# Patient Record
(demographics unavailable — no encounter records)

---

## 2024-10-07 NOTE — REASON FOR VISIT
[FreeTextEntry1] : [email or web address] [phone]  I had the pleasure of meeting with Candelario Casas (Benny), for a follow up appointment. Accompanied today by his. Parent - Rissa  Introduction:  Initial Consultation Assessment (6/2020):  Candelario Casas (Benny), age ~6.1 years, presented for consultation for concerns regarding possible autism. This has been a parental concern and a concern of some of his school teachers/psychologist. Reportedly there are some family members with some autistic features. The following is reported/observed: -atypical prosody -hand flapping -misses some social cues -somewhat has some focused interests -has a social interest -perhaps some weaknesses in eye contact -demonstrates shared enjoyment -some difficulty with transitions  Additionally Earl is noted to become distracted easily and requires redirection often.  Overall Earl's presentation is consistent with the following:  *ADHD- predominantly inattentive type  *Autism Spectrum Disorder (level 1 for social and level 1 for behavior)  *Additionally there are some fine motor concerns, speech concerns, and behavioral concerns.   INTERIM HISTORY:  School: __1/2023: -some behavioral issues -has 1:1 -was upset in school and rocking himself -varied beh in school - some great days and other times more scattered days -writing assignments - got upset b/c teacher said something about sharing it with the class -eloped down the de paz once - Principal became involved - relate to the writing assignment -reportedly has BIP in place __12/2023: -may refuse to do writing work - adding in some supports -wasn't participating in specials - parent made some consequences and now he is doing much better -Recess: doesn't participate, sits by himself -if paper wasn't neat might rip it out __10/2024: -still at times some refusals - responds more to natural consequences than in the past, maturing, so more able to convince him if needed than in the past  Diet issues: has in his mind that it is weird to eat in school. Also dislikes the smell in the cafeteria. Earl reports feels weird eating in school - doesn't make a diff where. Also notes doesn't like the smell of the cafeteria food. Reports wouldn't make a difference if could eat elsewhere in school __1/2023: continues to refuse to eat/drink in school __3/2023: some improvement in Gen Ed class instead of Dual Lang Gen Ed class. Good week last week. Not using beh chart anymore as was making him to stressed. Overall improvement in behavior although still some issues. Dislikes writing. __12/2023: -often refusing to eat in school - uncomfortable - sometimes drinks in school - when returns home he eats __10/2024: -now willing to eat in school  Summer: -mostly didn't use the AXR, and if needed used the short acting -Summer 2023: gained some wt over the summer but losing it over school yr. No medicine over the summer -Summer 2024: rarely used the medication - gained ~10 lbs. Did some swimming  ~10/2022 - trialed decrease from 15mg to 10mg - clear difference, much more benefit on the 15mg and so returned to 15mg  *Medication: -Name and Dose: AXR 15mg -Administration Time: 8am -Administration Method: swallowing -Wears off/Waning effect: Around 3:15pm -Duration: likely suff for school and completes his HW still -Effects/Benefit: __Home: benefit noted __School: clear benefit noted -more attentive, completing his work, doing much better with the medication, more cooperative. 3/2023 - clear benefit, teacher could tell a difference if missed a day 10/2024 - tried 2 days w/o it - couldn't concentrate, less emotionally regulated (got irate at a moment) ate more, more social at recess -benefit in terms of focus seem fair -Diet: eating less than usual - but not clearly related to the medication, perhaps not as hungry when in school -. Eats dinner and breakfast - not a lot - but not clearly related to the medication. On 15mg clear relation to medication. During pandemic had refused eating in school -rigid regarding it. Wont allow parents to pack a snack - just water. Eats when he comes home. Sometimes very hungry when returns home. Small breakfast. Dinner - feast or famine. Ate a lot more over the summer w/o the medication. Eats more on Wknds w/o the medication. Perhaps ate a little more when taking AXR 10mg. Mother - Kristy notes (1/2023) clear relationship with medication and diet. Mother - Rissa in the past hasn't reported clear relationship. 3/2023: a couple of days where ate ice cream in school - seems like making some progress 12/2023 - continues rarely eating in school partially related to med but also unrelated in large part -Sleep: no change -Other Side Effects: none -Weekends: just on school days  Adderall 5mg: -as needed - rarely use -lasts 3-4 hours. -sufficient benefit  Social: -best friend moved -some playdates with peer in school, some weaknesses socially. Plays w/ cousins __12/2023: -has 1-2 friends -wont sit with peers at lunch - sometimes w/ teacher or aide -has cousin in his class which is helpful __10/2024: -very few friends, has a cousin in his class. Recess mostly walks around and doesn't engage much with peers -now expresses social interest -seems nervous sharing information as feel may be judged negatively  *Therapy (Sandra Lopez, ~1/2021): -working on anger with transitions -10/2022: no longer seeing therapist, was hard to get him to go *Therapy: -Kimi Donnelly - [facility] - he enjoys going (started summer 2023-) - working on emotions and expressing himself (seen in [facility] as was having stressful day in school and began banging his head)  ***Prior to Medication (1/2021): Difficulty concentrating and doing class work. Doesn't want to do his work in school - negatively impacting his academic progress- doesn't demonstrate the skills he knows. Earl reports that 'cant work himself up to doing things' - the academics - and reports that he has diff focusing. . Wants to skip some of the assignments in class. Seems like a combination of focusing challenges and motivation issues.   SCHOOL HISTORY/DEVELOPMENTAL SERVICES: Academics: not demonstrating his skills - improving with medication, still not consistently. Reading chapter books at home __10/2022: above GL in many areas. OT issues with writing and so becomes rigid regarding __1/2023: doing ~so so __3/2023: very strong in some areas, but some areas of weakness, wont summarize things when asked/depending on how asked __12/2023: academically strong, doesn't like writing though and may refuse to do writing __10/2024: doing well   School Name: [employer] Grade: 5th Services: -regular classroom -shifted to [employer] in 4th grade, in past had IEP with OT, 1:1 for beh/attn, classroom accommodations, counseling -will be discontinuing individual counseling - wasn't participating - shifting more towards group counseling as does well with it - has Group counseling (participates well)  FBA (2021-): -added to his IEP  PREVIOUS ASSESSMENTS/SERVICES:  No EI or CPSE evaluation reports.   CURRENT FUNCTIONING/HISTORY OF PRESENTING CONCERNS:  ********As noted during his initial consultation ********  Concerns noted on our intake paperwork include: -K teacher and school psychologist suggested he be evaluated for autism -difficulties with social interactions, not wanting to join group activities -some flapping, covering his ears, trouble getting words out -has high intelligence -began having significant difficulties interaction with peers in [employer] - age 4 -some acting out behaviors and pushing peers in [employer] -special interest in Legos - can complete sets meant for much older children   Today's concerns: -difficulties with changes in routine -concern for Autism -at times needs assistance to help complete routine -disliking changing activities in school - like going to carpet or going to the bus  Began  at 3 yo: -often didn't participate -didn't like going to school - once got there, was ok -sometimes handsy with peers -at times repeated same jokes to peers, repetitively -personal space issues - tap people on shoulder, bump into peers - somewhat in a joking manner -participated only on his terms -made social bids but not always received well, however in general he is liked by peers  : -does ok with peers in K -still needs encouragement to participate/transition -some issues staying on task, or not participating  *Teacher intake form (5/2020): -General Ed, dual language - Eng/Mosotho -bright child, excels in reading -weaknesses in writing and attention span -little to no interest in being in school, lacks motivation, has trouble completing work -worse behavior in structured academic setting -shows little interest in peers, seems to parallel play -Strategies: pref seating, separate space for him, buddy helper at his table -Reading: on grade level -Math: on grade level -Writing: inconsistent, incorrect pencil , needs extended time -Rarely completes seatwork and lacks quality of work -Do you feel child is in need of SPED services? Feel he is in need of accommodations and additional classroom support  Language/Communication: -Eye contact: fair, although somewhat fleeting at times, at times hard to get his attention if involved in something else -Response to name: variable, especially if involved in another task - in his own world - seems to hear what is being said but ignoring -When younger - pointed, perhaps without associated eye contact -likely followed a point with associated eye contact -demonstrates shared enjoyment - fairly robust, looks for acknowledgement / praise -sometimes some weaknesses in picking up on social cues -speaks in sentences, at times takes him time to get the words out -may continue speaking about something of interest to him and not realize - even when verbalized-that listener has lost interest - peers or adults -no echolalia -tries telling jokes, tends to be very funny - and tries to be funny -able to have a reciprocal conversation, at times can report events -prosody: atypical prosody  Behavioral: -sometimes growls at adults if doesn't want to do something -not more active than peers -easily distracted, focusing challenges -takes longer than expected to complete school work -attentional challenges even when motivated to do so -gets distracted when trying to complete Lego project -some motivational issues with school work, but attentional challenges beyond motivation issues  Emotional: -generally happy - although mood can change quickly - can get upset if asked to do something he doesn't want to do - stephanie. around transitions - but can calm fairly quickly, and responds to threat of consequences -may seem down /upset if doesn't want to do something being asked of him/transitions  Social/Play Skills: -has some friends, liked by many peers, has had some playdates that generally go well -some issues with boundaries, personal space issues -may want to lead the play, but somewhat flexible -has a social interest -enjoys Legos, [employer] -generally plays with peers in their intended manner   Atypical Behaviors/Sensory: -transition challenges - more from preferred to non-preferred activities, overall fairly flexible - timers sometimes are helpful -sometimes lines up blocks - 'makes a path' -hand flapping: when watching something exciting on TV, or bouncing on the floor/couch - more so as he has gotten older - but varies - when excited or stressed -in past: played with his ear, folding over the auricle -Loud noises: dislikes hand dryer, etc. -dislikes 'chaotic' social gatherings- will run around 'like crazy' with one peer, but doesn't like when many peers running around -Texture: likes fleece, but not obsessively -no visual inspection -no focus on wheels -likes spinning around but doesn't seem atypical -no finger flicking, no visual stim behaviors -intermittent very focused interest  Early Developmental Milestones: -late walking (clubfoot) and some speech delay (minor)   ADAPTIVE FUNCTIONING:  Self-Care: Fair skills  Toileting: No current issues  Feeding: "5 yo diet", at times only wants the same food all the time. Has some variety. Eats different textures. Often doesn't want to try new foods. Somewhat picky with brand of chicken nuggets  Sleep: no issues. Some slight snoring.  *********  *MEDICAL HISTORY:  Current Medications: -AXR 15mg -Adderall 5mg  Medication History:  *MERLY 10mg (~1/2021-STOPPED, tried briefly): -mouth twitching, severe, no benefit  *Adderall 5mg (1-2/2021 --): -benefit noted, no SE, ~ 3 hours  *AXR 5mg (2/2021- 10/2024): -10mg: doing well -10/2022: taking 15mg - to trial down to 10mg -1/2023: insuff benefit on 10mg, returned to 15mg -10/2024: likely suff benefit, but decr appetite (but still takes in fair amount of calories during day and gaining wt), perhaps mildly more social w/o medication - but clear benefit and fair duration. Trialing alternate to see if any overall improvement but can return to if needed  *Vyvanse 40mg (10/2024-): -to trial  Allergies: -none  UPDATED PAST MEDICAL HISTORY: There have been no recent major medical changes.   Birth History: -FT, emergency C/S secondary to nuchal cord, regular nursery -maternal medications: fluoxetine  ROS: A 10-point review of systems was performed. No concerns regarding vision and hearing. No cardiovascular, respiratory, gastrointestinal, renal, endocrine, neurologic, or dermatologic concerns. -Clubfoot -Lyme disease ae 3  Orthopedist: continues to follow, clubfoot largely resolved  Audiology: screened by PMD, no concerns  Vision: perhaps some weaknesses but not a major concern at this time  Hospitalizations/ Surgeries: -serial achilles tenotomy for b/l clubfoot (3 and 7 months)   FAMILY HISTORY: His mother (Rissa - is reported as Candelario's genetic father) is a professor - Mathematics. His mother (Kristy) is a . No school issues  There is an extended family history of: -foot issues: both parents - one parent had intoeing, one had aspects of clubfooting -Anxiety/Nervousness: both parents -Bipolar: mother - Kristy -Depression: parents -HTN -Heart disease -Autism: some features in mother (Rissa) and GF -Heart murmur: slight, aunt -Afib: GM - as an older person (~60s)  There is no extended family history of mental health issues or developmental issues(such as OCD, schizophrenia, ADHD, intellectual disability learning disabilities, speech delay) There is no history of heart rhythm problems, or of sudden death.  SOCIAL HISTORY: -lives with his parents. -parents are living together but are legally   *PE (10/7/24): -Well appearing -S1S2, RRR -CTA b/l -low tone, fair strength, ambulated independently -FROM extremities -normal appearing uvula and palate -PERRLA  -no reported significant neurocutaneous findings  Observations (4/1/20): -Some eye contact via video -told me names of friends -Definition of a friend - 'to play' -named some names of friends, said [place] was the silliest, but that he doesn't want to be my friend anymore - but couldn't explain why -somewhat atypical prosody -somewhat dysfluent in his speech - but able to have a somewhat reciprocal conversation, speaks in sentences - slow rate -interested in talking with me -upon request showed me some of a Lego he built -atypical prosody (6/24/20): -atypical social approach -able to have somewhat of a simple reciprocal conversation, and interested in conversing with me, but somewhat limited/weaknesses noted -atypical prosody and tone -showed me a favorite toy when asked -demonstrated joint attention and shared enjoyment, friendly (12/4/23 TH): -participated -often slow response to questions -atypical social approach (10/7/24 in person): -participated in visit, atypical social approach, atypical prosody, somewhat slow in responses - difft than prior visits which were remote where his fluency and rate of speech were quicker and he seemed generally happier - Parent reports she agrees that in person he presents in this fashion and at home presents differently somewhat   * LABORATORY/TEST RESULTS/DEVELOPMENTAL ASSESSMENTS:  (6/2020) __Child and Adolescent Symptom Inventory-5(AMELIE-5):  (6/2020) Informant: Parent -inattention: 5/9 -hyperactivity/impulsivity: 2/9 -oppositional and defiant behaviors: 3/8 -anxiety symptoms: a number endorsed -social deficits/restricted and repetitive interests/behaviors: many endorsed __Endorsed as often/very often: -doesn't seem to care about doing a bad job -makes twitching/jerking movements -makes vocal sounds for no apparent reason -refuses to speak, other than to family members -abnormally shy, more anxious in social situations than most other youth, excessively shy with peers, when put in uncomf. Social situation cries/freezes/withdraws -prefers to be alone rather than with friends/family -little interest in having close relationships  (5/2020) Informant: Teacher - K, general ed -Tests/quizzes/HW: average -Class participation: poor -Class behavior: below avg -Academics: around Grade level -inattention: 8/9 -hyperactivity/impulsivity: 3/9 -oppositional and defiant behaviors: 5/8 -social deficits/restricted and repetitive interests/behaviors: A number endorsed __Endorsed as often/very often: -doesn't seem to care about doing a bad job -irritable -more anxious in social situations than most of students (often) -shows little interest in having close relationships (often) -emotionally cold/indifferent towards people (often) -has auditory hallucinations (often) - parents report that if he misbehaves in school - he may say 'my mind told me to do it' - and parents think may have come from a TV show -temper outburst -acts unhappy/sad -irritable/angry most of the day  (1/14/21) [place] Assessment: -Informant: Ms Reed,  -Inattention: 7/9 -Hyperactivity/Impulsivity: 4/9 Endorsed as often/very often: -loses temper -actively defies -spiteful/vindictive -fearful/anxious -afraid to try new things -sad/unhappy -Reading: problem -Math: average -Written Expr: problematic -classroom behavior: all endorsed as somewhat of a problem/problematic.   __Child and Adolescent Symptom Inventory-5(AMELIE-5): (10/2024) Informant: Parent  -inattention: 8/9  -hyperactivity/impulsivity: 2/9 -oppositional and defiant behaviors: 1/8 -conduct disorder: not endorsed -anxiety symptoms: a few endorsed -depressive symptoms: not endorsed -social deficits/language deficits/restricted and repetitive interests/behaviors: a few endorsed

## 2025-01-08 NOTE — SOCIAL HISTORY
[FreeTextEntry6] : Household: Mothers and Candelario -Parents are legally  but live together  Mother (Rissa is reported as Candelario's genetic father):   Mother (Kristy):

## 2025-01-08 NOTE — REASON FOR VISIT
[FreeTextEntry2] : Follow up for ADHD and ASD monitoring and medication management. [FreeTextEntry4] : Adderall XR 15mg on school days (Was to trial Vyvanse 40mg but has not been able to get it)  [FreeTextEntry1] : Candelario and Mother [FreeTextEntry3] : October 2024

## 2025-01-08 NOTE — PHYSICAL EXAM
[Normal] : awake and interactive [Attention Intact] : attention intact [Well-behaved during visit] : well-behaved during visit [Quiet/calm] : quiet/calm [Positive mood] : positive mood [Answered questions appropriately] : answered questions appropriately [Fidgets] : does not fidget [Oppositional] : not oppositional

## 2025-01-08 NOTE — HISTORY OF PRESENT ILLNESS
[FreeTextEntry5] : Grade/School: 5th Grade. Classroom Setting: General Education Classroom 504 Plan Services: group counseling Private tutoring/therapy: Meeting with therapist through [facility]  [FreeTextEntry1] : School/Academics: -Mom reports that this school year has been more challenging for him -She doesn't feel this year's teacher is a good fit for him and is less understanding of his needs -Mom reports a few incidents where he is becoming overwhelmed and will hit himself on the head to try and get out of class. Raúl says this year has been stressful and sometimes he needs a break -They are using a new curriculum this year that involves more long-term projects versus nightly homework. Raúl says he hates this system. Mom agrees it's been hard for him as he struggles with EF skills. -Mom reports that he continues to do well academically however they are seeing his grades level off now. -They plan to have a meeting with SD soon to see what accommodations and supports they can put in place for him. Mom says the SD told her they don't feel he needs an evaluation because he does too well academically. Mom would like to get through this year and see how the transition into middle school goes before requesting an educational evaluation. -Mom reports that he enjoys creative writing on his terms but struggles with writing assignments that are given to him. He will still sometimes refuse but less than the past.  Home: No Concerns -Mom reports that he started working with a behavioral therapist last year when the family dog passed away. Mom believes this has been helpful and made a difference in his ability to manage his emotions.  Social: Overall does ok and gets along with his peers but has some challenges engaging. Might be nervous to interact and be judged. Mom reports that he has a very nice group of classmates this year, so they are seeing some more social comfortability from Raúl.   Medication/Side Effects: -Mom reports that they were unable to trial Vyvanse 40mg due to shortages/availability. She says they are open to trialing it in the future but would prefer to start when it's more consistently available. -Raúl returned to his regimen of Adderall XR 15mg with no issues and good effect on his symptoms. Mom reports that they accidently forgot to give it to him yesterday and there was a significant difference where he needed to be sent home early without the medicine.  Duration: Takes it at 8am and wears off about 3:15pm Appetite/Diet:  He has now started eating while in school during the day but there is still some decrease in appetite with the medicine. Continues to eat well at home too. Sleep: Overall sleeps well - no difficulty falling/staying asleep HA: None SA: None Tics: None [FreeTextEntry6] : Medication Hx: *MERLY 10mg (~1/2021-STOPPED, tried briefly): -mouth twitching, severe, no benefit *Adderall 5mg (1-2/2021 --): -benefit noted, no SE, ~ 3 hours *AXR 5mg (2/2021-): -10mg: doing well -10/2022: taking 15mg - to trial down to 10mg -1/2023: insuff benefit on 10mg, returned to 15mg *Vyvanse 40mg (10/2024-): -to trial

## 2025-01-08 NOTE — PLAN
[FreeTextEntry3] : Continue 504 Plan Continue private therapy sessions through [facility] Continue Adderall XR 15mg on school days. Advised parent that if Vyvanse 40mg becomes available and they'd like to trial to call the office. Answered parent/patient questions. Follow-up 3-4 months for continued monitoring Follow-up via telephone as needed.

## 2025-03-11 NOTE — HISTORY OF PRESENT ILLNESS
[FreeTextEntry5] : Grade/School: 5th Grade. Classroom Setting: General Education Classroom 504 Plan Services: group counseling, shared 1:1 Private tutoring/therapy: Meeting with therapist 1x/week through [facility]  [FreeTextEntry1] : School/Academics: -Mom report ongoing behavioral challenges this school year. Raúl acknowledges things have been hard for him.  -There have been increasing incidents where Raúl is becoming overwhelmed or frustrated and reacting impulsively.  -Last month he was suspended for having a sudden explosive outburst when the teacher announced an unexpected quiz. Raúl became very upset by this and repeatedly hit his teacher with his privacy barrier.  -In other scenarios if he is upset or dysregulated, he has been eloping -He now has a shared para throughout the day due to the increased episodes of eloping.  -At his 504 meeting, the team collectively agreed that he needs a reevaluation - he is doing well academically but plateauing and experiences increasing behavioral issues like eloping from class and meltdowns.   -They are using a new curriculum this year that involves more long-term projects versus nightly homework. Raúl says he hates this system. Mom agrees it's been hard for him as he struggles with EF skills. This continues to be the worst part of school according to Raúl.  Home:  -Mom reports that he continues to work with his therapist weekly which has been very helpful - they say his ability to express himself has improved significantly.  -The therapist expressed that he is very anxious, and they also feel mildly depressed. Mom agrees that they too have the same concerns. -They have some concerns for the possibility of a mood disorder as Raúl's birth mother (Kristy) has bipolar disorder and went through explosive angry outbursts as an adult until treated with Lamotrigine. They are open to having an evaluation with pediatric psychiatry.  -Raúl expressed in therapy that he is lonely and wants a friend but struggles to make those connections. They have started trying to schedule play dates with some of his peers to help with this and give him more opportunities to engage -Mom reports that he eloped for the first time outside of school during his swim class. He wasn't getting along well with the substitute instructor and when he was asked to do something he didn't like, he just took off. Mom reports that it was like an impulsive avoidance response.   Social: Overall does ok and gets along with his peers but has some challenges engaging. Might be nervous to interact and be judged. Mom reports that he has a very nice group of classmates this year, so they are seeing some more social comfortability from Raúl.   Medication/Side Effects: -Mom feels that Raúl's focus/attention are well regulated on the current AXR 15mg regimen but feels he needs more support in managing his impulsive emotional reactions when he becomes upset/frustrated.  -We discussed a trial of guanfacine ER and how it might provide additional symptom coverage that targets his impulsive behaviors and quick emotional responses.  -We discussed that due to the family history of bipolar disorder it would be more appropriate for him to be evaluated by pediatric psychiatry to determine the most appropriate medication approach if recommended.  Duration: Takes it at 8am and wears off about 3:15pm Appetite/Diet:  He has now started eating while in school during the day but there is still some decrease in appetite with the medicine. Continues to eat well at home too. Sleep: Overall sleeps well - no difficulty falling/staying asleep HA: None SA: None Tics: None [FreeTextEntry6] : Medication Hx: *MERLY 10mg (~1/2021-STOPPED, tried briefly): -mouth twitching, severe, no benefit *Adderall 5mg (1-2/2021 --): -benefit noted, no SE, ~ 3 hours *AXR 5mg (2/2021-): -10mg: doing well -10/2022: taking 15mg - to trial down to 10mg -1/2023: insuff benefit on 10mg, returned to 15mg *Vyvanse 40mg (10/2024-): -to trial

## 2025-03-11 NOTE — REASON FOR VISIT
[FreeTextEntry2] : Follow up for ADHD and ASD monitoring and medication management. [FreeTextEntry4] : Adderall XR 15mg on school days   [FreeTextEntry1] : [facility] [FreeTextEntry3] : January 2025 [TextEntry] : This visit was provided via telehealth using real-time 2-way audio visual technology. The patient, MARVIN POWERS was located at home, [address] [place] [address], at the time of the visit.  The provider, PERI TINSLEY, was located in [facility] at the time of the visit. The patient, MARVIN POWERS and Provider participated in the telehealth encounter. Parent also participated. Verbal consent for telehealth services was given on Mar 11 2025  5:30PM by the patient/parent.

## 2025-03-11 NOTE — PLAN
[FreeTextEntry3] : Continue 504 Plan - Requested updated testing/reports once reevaluation is completed  Continue private therapy sessions 1x/week through [facility] Continue Adderall XR 15mg on school days.  Start Guanfacine ER 1mg at bedtime daily.  Provided psychiatry referrals to parent.  Follow-up 1-2 months for continued monitoring Follow-up via telephone as needed.

## 2025-03-11 NOTE — REASON FOR VISIT
[FreeTextEntry2] : Follow up for ADHD and ASD monitoring and medication management. [FreeTextEntry4] : Adderall XR 15mg on school days   [FreeTextEntry1] : [facility] [FreeTextEntry3] : January 2025 [TextEntry] : This visit was provided via telehealth using real-time 2-way audio visual technology. The patient, MARVIN POWERS was located at home, [address] [place] [address], at the time of the visit.  The provider, PERI TINSLEY, was located in [facility] at the time of the visit. The patient, MARVIN POWERS and Provider participated in the telehealth encounter. Parent also participated. Verbal consent for telehealth services was given on Mar 11 2025  5:30PM by the patient/parent.

## 2025-07-23 NOTE — PHYSICAL EXAM
[Normal] : awake and interactive [Attention Intact] : attention intact [Well-behaved during visit] : well-behaved during visit [Quiet/calm] : quiet/calm [Positive mood] : positive mood [Answered questions appropriately] : answered questions appropriately [Fidgets] : does not fidget [Oppositional] : not oppositional [Appropriate eye contact] : no appropriate eye contact [Smiles responsively] : smiles responsively

## 2025-07-23 NOTE — HISTORY OF PRESENT ILLNESS
[FreeTextEntry5] : Grade/School: 6th Grade. (Entering in September) Classroom Setting: General Education Classroom 504 Plan  Services: group counseling, shared 1:1 Private tutoring/therapy: Meeting with therapist 1x/week through [facility]  [FreeTextEntry1] : School/Academics: -[facility] and mom report that the end of the year went ok -He did well during graduation despite some emotional challenges during the rehearsal -Mom reports that this was an emotionally challenging year for him - he experienced a lot of anxiety and would react impulsively by eloping or lashing out -Mom reports that the SD decided not to reinstate his IEP and instead keep his 504 plan because his concerns aren't academic based - mom was taken back by this especially after the diagnoses from the district psychiatrist.  -He will have an aide for unstructured times during the day and to help him navigate the building -He continues to do well academically -Raúl expressed that he is a little nervus for middle school - he hopes to make some friends  Home:  -Raúl reports that his mood has been ok - not overly sad or anxious.  -Mom agrees that things have settled down and he seems more relaxed - likely due to less demands over the summer -Mom reports that he continues to work with his therapist weekly which has been very helpful - his ability to express himself has improved significantly.   Social: Overall does ok and gets along with his peers but has some challenges engaging. Might be nervous to interact and be judged. Mom reports that he has a very nice group of classmates this year, so they are seeing some more social comfortability from Raúl.   Activities: Swim classes  Medication/Side Effects: -Raúl and mom report that the Vyvanse has been well tolerated with no obvious side effects. Mom is thrilled that it doesn't seem to impact his appetite. -She notes that there is a difference on versus off the 10mg dose however she anticipates that he'll need a stronger dose for the start of school. Mom would like to trial 20mg before school starts to see how he responds -Raúl expressed that he likes the Vyvanse better than the Adderall XR  Duration: Takes it at 8am and wears off about 3:15pm Appetite/Diet: Willing to eat a few snacks in school but continues to eat well at home when the medicine wears off.  Sleep: Overall sleeps well - no difficulty falling/staying asleep HA: None SA: None Tics: None [FreeTextEntry6] : Medication Hx: *MERLY 10mg (~1/2021-STOPPED, tried briefly): -mouth twitching, severe, no benefit *Adderall 5mg (1-2/2021 --): -benefit noted, no SE, ~ 3 hours  *AXR 5mg (2/2021-): -10mg: doing well -10/2022: taking 15mg - to trial down to 10mg -1/2023: insuff benefit on 10mg, returned to 15mg  *Guanfacine ER (~Winter 2025): -few wks, no benefit, worsened mood  *Vyvanse 10mg (6/2025): -7/2025 - well tolerated with some benefit - likely insufficient for school - will trial 20mg

## 2025-07-23 NOTE — REASON FOR VISIT
[FreeTextEntry2] : Follow up for ADHD and ASD monitoring and medication management. [FreeTextEntry4] : Vyvanse 10mg daily   [FreeTextEntry1] : [facility] [FreeTextEntry3] : January 2025

## 2025-07-23 NOTE — PLAN
[FreeTextEntry3] : Continue IEP/current services - requested copy of new IEP once completed.  Continue private therapy sessions 1x/week through [facility] Start Vyvanse 20mg daily Requested updated parent/teacher rating scales for next visit.  Follow-up 3-4 months for continued monitoring Follow-up via telephone as needed.